# Patient Record
Sex: FEMALE | Race: WHITE | NOT HISPANIC OR LATINO | Employment: FULL TIME | URBAN - METROPOLITAN AREA
[De-identification: names, ages, dates, MRNs, and addresses within clinical notes are randomized per-mention and may not be internally consistent; named-entity substitution may affect disease eponyms.]

---

## 2017-06-16 RX ORDER — IBUPROFEN 800 MG/1
800 TABLET ORAL EVERY 8 HOURS SCHEDULED
Qty: 40 TABLET | Refills: 1 | Status: SHIPPED | OUTPATIENT
Start: 2017-06-16 | End: 2017-10-27 | Stop reason: SDUPTHER

## 2017-10-10 DIAGNOSIS — R92.8 ABNORMALITY OF LEFT BREAST ON SCREENING MAMMOGRAM: Primary | ICD-10-CM

## 2017-10-12 ENCOUNTER — TELEPHONE (OUTPATIENT)
Dept: OBSTETRICS AND GYNECOLOGY | Facility: CLINIC | Age: 52
End: 2017-10-12

## 2017-10-12 NOTE — TELEPHONE ENCOUNTER
----- Message from Usman Mendosa MD sent at 10/10/2017  9:53 PM EDT -----      ----- Message -----     From: Vicki L Severs, RegSched Rep     Sent: 10/10/2017  10:25 AM       To: Usman Mendosa MD    [facility]

## 2017-10-18 ENCOUNTER — TELEPHONE (OUTPATIENT)
Dept: OBSTETRICS AND GYNECOLOGY | Facility: CLINIC | Age: 52
End: 2017-10-18

## 2017-10-18 NOTE — TELEPHONE ENCOUNTER
----- Message from Usman Mendosa MD sent at 10/17/2017  4:21 PM EDT -----      ----- Message -----     From: Vicki L Severs, RegSched Rep     Sent: 10/17/2017   9:19 AM       To: Usman Mendosa MD    L DIAG MAMMO DONE 10/16/17 [facility]

## 2017-10-24 ENCOUNTER — OFFICE VISIT (OUTPATIENT)
Dept: OBSTETRICS AND GYNECOLOGY | Facility: CLINIC | Age: 52
End: 2017-10-24

## 2017-10-24 VITALS
WEIGHT: 186 LBS | DIASTOLIC BLOOD PRESSURE: 70 MMHG | BODY MASS INDEX: 35.12 KG/M2 | SYSTOLIC BLOOD PRESSURE: 118 MMHG | HEIGHT: 61 IN

## 2017-10-24 DIAGNOSIS — Z01.419 WELL FEMALE EXAM WITH ROUTINE GYNECOLOGICAL EXAM: Primary | ICD-10-CM

## 2017-10-24 DIAGNOSIS — E66.9 OBESITY (BMI 35.0-39.9 WITHOUT COMORBIDITY): ICD-10-CM

## 2017-10-24 DIAGNOSIS — Z12.4 PAP SMEAR FOR CERVICAL CANCER SCREENING: ICD-10-CM

## 2017-10-24 DIAGNOSIS — Z80.41 FAMILY HISTORY OF OVARIAN CANCER: ICD-10-CM

## 2017-10-24 DIAGNOSIS — Z13.89 SCREENING FOR BLOOD OR PROTEIN IN URINE: ICD-10-CM

## 2017-10-24 DIAGNOSIS — Z12.11 SCREEN FOR COLON CANCER: ICD-10-CM

## 2017-10-24 LAB
BILIRUB BLD-MCNC: NEGATIVE MG/DL
CLARITY, POC: CLEAR
COLOR UR: YELLOW
GLUCOSE UR STRIP-MCNC: NEGATIVE MG/DL
KETONES UR QL: NEGATIVE
LEUKOCYTE EST, POC: NEGATIVE
NITRITE UR-MCNC: NEGATIVE MG/ML
PH UR: 5.5 [PH] (ref 5–8)
PROT UR STRIP-MCNC: NEGATIVE MG/DL
RBC # UR STRIP: ABNORMAL /UL
SP GR UR: 1.02 (ref 1–1.03)
UROBILINOGEN UR QL: NORMAL

## 2017-10-24 PROCEDURE — 99396 PREV VISIT EST AGE 40-64: CPT | Performed by: OBSTETRICS & GYNECOLOGY

## 2017-10-24 PROCEDURE — 81002 URINALYSIS NONAUTO W/O SCOPE: CPT | Performed by: OBSTETRICS & GYNECOLOGY

## 2017-10-24 RX ORDER — CLOBETASOL PROPIONATE 0.5 MG/G
CREAM TOPICAL
Refills: 0 | COMMUNITY
Start: 2017-07-26

## 2017-10-24 NOTE — PROGRESS NOTES
[facility]  Routine Annual Visit    10/24/2017    Patient: Claudia Rahman          MR#:[id number]      History of Present Illness    52 y.o. female  who presents for annual exam.    The patient reports feeling well without complaints.  Previously referred for colonoscopy but never had screening done.  A repeat referral was submitted today.  Review of the patient's history shows that her mom was diagnosed with ovarian cancer in her 50s.  The patient reports her mom did not eventually  of ovarian cancer that was treated with radiation.  We discussed that ovarian cancer is rarely treated with radiation and question the certainty of the diagnosis.  The patient is certain of mother was diagnosed with ovarian.  We discussed screening options for the patient and genetic testing options for the patient.  We'll proceed with myriad screening for the patient as well as ISABELA screening with ultrasound    No LMP recorded (lmp unknown). Patient is postmenopausal.  Obstetric History:  OB History      Para Term  AB Living    2 1 1   2    SAB TAB Ectopic Multiple Live Births        2         Menstrual History:     No LMP recorded (lmp unknown). Patient is postmenopausal.       Sexual History:       ________________________________________  Patient Active Problem List   Diagnosis   • Well female exam with routine gynecological exam   • PVC (premature ventricular contraction)   • Abnormality of left breast on screening mammogram   • Family history of ovarian cancer   • Obesity (BMI 35.0-39.9 without comorbidity)       Past Medical History:   Diagnosis Date   • Helicobacter pylori (H. pylori) infection    • History of EKG 2016       Past Surgical History:   Procedure Laterality Date   • APPENDECTOMY     • BREAST LUMPECTOMY     • CHOLECYSTECTOMY     • TUBAL ABDOMINAL LIGATION         History   Smoking Status   • Never Smoker   Smokeless Tobacco   • Never Used       Family  "History   Problem Relation Age of Onset   • Osteoporosis Mother    • Hypertension Mother    • Heart disease Mother    • Diabetes Mother    • Ovarian cancer Mother 50   • Lung cancer Sister 59   • Breast cancer Neg Hx    • Uterine cancer Neg Hx    • Colon cancer Neg Hx    • Melanoma Neg Hx    • Prostate cancer Neg Hx        Prior to Admission medications    Medication Sig Start Date End Date Taking? Authorizing Provider   clobetasol (TEMOVATE) 0.05 % cream apply sparingly to affected area twice a day 7/26/17  Yes Historical Provider, MD   ibuprofen (ADVIL,MOTRIN) 800 MG tablet Take 1 tablet by mouth Every 8 (Eight) Hours. 6/16/17  Yes Usman Mendosa MD   RA LORATA-D  MG per 24 hr tablet  4/21/16  Yes Historical Provider, MD   estradiol-norethindrone (ACTIVELLA) 1-0.5 MG per tablet take 1 tablet by mouth once daily 8/29/16 10/24/17  Usman Mendosa MD   PARoxetine (PAXIL) 20 MG tablet take 1 tablet by mouth once daily 5/21/16 10/24/17  Historical Provider, MD     ________________________________________    Current contraception: tubal ligation  History of abnormal Pap smear: no  Family history of uterine or ovarian cancer: yes - Mother at 50  Family History of colon cancer/colon polyps: no  History of abnormal mammogram: yes - diagnostic vinayak normal  History of abnormal lipids: no    The following portions of the patient's history were reviewed and updated as appropriate: allergies, current medications, past family history, past medical history, past social history, past surgical history and problem list.    Review of Systems    Pertinent items are noted in HPI.     Objective   Physical Exam    /70  Ht 61\" (154.9 cm)  Wt 186 lb (84.4 kg)  LMP  (LMP Unknown)  Breastfeeding? No  BMI 35.14 kg/m2   BP Readings from Last 3 Encounters:   10/24/17 118/70   07/25/16 130/86      Wt Readings from Last 3 Encounters:   10/24/17 186 lb (84.4 kg)   07/25/16 185 lb (83.9 kg)        BMI: Estimated body mass index is " "35.14 kg/(m^2) as calculated from the following:    Height as of this encounter: 61\" (154.9 cm).    Weight as of this encounter: 186 lb (84.4 kg).    General:   alert, appears stated age and cooperative   Heart: regular rate and rhythm, S1, S2 normal, no murmur, click, rub or gallop   Lungs: clear to auscultation bilaterally   Abdomen: soft, non-tender, without masses or organomegaly   Breast: inspection negative, no nipple discharge or bleeding, no masses or nodularity palpable   Vulva: normal   Vagina: normal mucosa   Cervix: no lesions   Uterus: normal size   Adnexa: exam limited by habitus     As part of wellness and prevention, the following topics were discussed with the patient:    []  Nutrition  []  Physical activity/regular exercise   [x]  Healthy weight  []  Injury prevention  []  Substance misuse/abuse  []  Sexual behavior  []  STD prevention  []  Contaception  []  Dental health  []  Mental health  []  Immunization  [x]  Encouraged SBE       Assessment:    Claudia was seen today for gynecologic exam.    Diagnoses and all orders for this visit:    Well female exam with routine gynecological exam    Screening for blood or protein in urine  -     POC Urinalysis Dipstick    Pap smear for cervical cancer screening  -     IgP, Aptima HPV - ThinPrep Vial, Cervix    Screen for colon cancer  -     Ambulatory Referral For Screening Colonoscopy    Family history of ovarian cancer  -     Cancel:   -     Ovarian Malignancy Risk-ISABELA  -     US Pelvis Complete; Future    Obesity (BMI 35.0-39.9 without comorbidity)      Plan:  Return in about 1 year (around 10/24/2018) for Annual GYN exam.      Usman Mendosa MD  10/24/2017 9:43 AM  "

## 2017-10-25 LAB
CANCER AG125 SERPL-ACNC: 20.1 U/ML (ref 0–38.1)
CONV COMMENT: NORMAL
HE4 SERPL-SCNC: 45.8 PMOL/L (ref 0–105.2)
POSTMENOPAUSAL INTERP: LOW: NORMAL
PREMENOPAUSAL INTERP: LOW: NORMAL
ROMA SCORE POSTMEN SERPL: 1.28
ROMA SCORE PREMEN SERPL: 0.62

## 2017-10-27 ENCOUNTER — TELEPHONE (OUTPATIENT)
Dept: OBSTETRICS AND GYNECOLOGY | Facility: CLINIC | Age: 52
End: 2017-10-27

## 2017-10-27 LAB
CYTOLOGIST CVX/VAG CYTO: NORMAL
CYTOLOGY CVX/VAG DOC THIN PREP: NORMAL
DX ICD CODE: NORMAL
HIV 1 & 2 AB SER-IMP: NORMAL
HPV I/H RISK 4 DNA CVX QL PROBE+SIG AMP: NEGATIVE
OTHER STN SPEC: NORMAL
PATH REPORT.FINAL DX SPEC: NORMAL
STAT OF ADQ CVX/VAG CYTO-IMP: NORMAL

## 2017-10-27 RX ORDER — IBUPROFEN 800 MG/1
TABLET ORAL
Qty: 40 TABLET | Refills: 1 | Status: SHIPPED | OUTPATIENT
Start: 2017-10-27 | End: 2019-02-13 | Stop reason: SDUPTHER

## 2017-10-27 NOTE — TELEPHONE ENCOUNTER
----- Message from Usman Mendosa MD sent at 10/25/2017  4:00 PM EDT -----  Call patient roll a test risk of ovarian malignancy test is normal (low probability)

## 2017-10-30 ENCOUNTER — TELEPHONE (OUTPATIENT)
Dept: OBSTETRICS AND GYNECOLOGY | Facility: CLINIC | Age: 52
End: 2017-10-30

## 2017-10-30 NOTE — TELEPHONE ENCOUNTER
----- Message from Usman Mendosa MD sent at 10/28/2017  8:51 AM EDT -----  Call pt:  PAP is negative.

## 2017-10-31 ENCOUNTER — PROCEDURE VISIT (OUTPATIENT)
Dept: OBSTETRICS AND GYNECOLOGY | Facility: CLINIC | Age: 52
End: 2017-10-31

## 2017-10-31 DIAGNOSIS — E66.9 OBESITY (BMI 35.0-39.9 WITHOUT COMORBIDITY): ICD-10-CM

## 2017-10-31 DIAGNOSIS — Z80.41 FAMILY HISTORY OF OVARIAN CANCER: ICD-10-CM

## 2017-10-31 PROCEDURE — 76830 TRANSVAGINAL US NON-OB: CPT | Performed by: OBSTETRICS & GYNECOLOGY

## 2017-11-01 ENCOUNTER — TELEPHONE (OUTPATIENT)
Dept: OBSTETRICS AND GYNECOLOGY | Facility: CLINIC | Age: 52
End: 2017-11-01

## 2017-11-01 NOTE — TELEPHONE ENCOUNTER
----- Message from Usman Mendosa MD sent at 11/1/2017  8:17 AM EDT -----  Call patient and advised pelvic ultrasound essentially within normal limits.  Ovaries were not visualized which is not uncommon in a postmenopausal female

## 2017-11-09 ENCOUNTER — TELEPHONE (OUTPATIENT)
Dept: OBSTETRICS AND GYNECOLOGY | Facility: CLINIC | Age: 52
End: 2017-11-09

## 2017-11-09 NOTE — TELEPHONE ENCOUNTER
----- Message from Usman Mendosa MD sent at 11/8/2017  2:39 AM EST -----      Progress Notes      Call pt:  Myriad comprehensive genetic screening is NEGATIVE.     NO significant genotypes are identified that predispose the patient to cancer.     A separate report will be sent to the patient for her records.     The report provides her lifetime risk of breast cancer .... Which slightly below the baseline for the population.

## 2019-01-09 ENCOUNTER — OFFICE VISIT (OUTPATIENT)
Dept: SURGERY | Facility: CLINIC | Age: 54
End: 2019-01-09

## 2019-01-09 VITALS
DIASTOLIC BLOOD PRESSURE: 80 MMHG | OXYGEN SATURATION: 99 % | SYSTOLIC BLOOD PRESSURE: 140 MMHG | HEIGHT: 60 IN | HEART RATE: 84 BPM | BODY MASS INDEX: 37.18 KG/M2 | WEIGHT: 189.4 LBS

## 2019-01-09 DIAGNOSIS — Z12.11 ENCOUNTER FOR SCREENING COLONOSCOPY: Primary | ICD-10-CM

## 2019-01-09 PROCEDURE — 99203 OFFICE O/P NEW LOW 30 MIN: CPT | Performed by: SURGERY

## 2019-01-09 RX ORDER — LANOLIN ALCOHOL/MO/W.PET/CERES
400 CREAM (GRAM) TOPICAL DAILY
COMMUNITY
End: 2020-03-18

## 2019-01-09 RX ORDER — SACCHAROMYCES BOULARDII 250 MG
250 CAPSULE ORAL 2 TIMES DAILY PRN
COMMUNITY
End: 2021-03-24

## 2019-01-09 NOTE — PROGRESS NOTES
Chief Complaint   Patient presents with   • Diarrhea     needs c-scope        Patient is a 53 y.o. female referred by Usman Mendosa MD for a screening colonoscopy.  Patient reports she has never had a flexible sigmoidoscopy, barium enema or colonoscopy.  Patient denies any melena, hematochezia, bright red blood per rectum, unexplained weight loss, nausea, vomiting, fever or chills.  Patient denies any family history of ulcerative colitis, Crohn's disease, familial polyposis or colon cancer.    Past Medical History:   Diagnosis Date   • Diarrhea    • Helicobacter pylori (H. pylori) infection    • History of EKG 07/06/2016   • PVC (premature ventricular contraction)      Past Surgical History:   Procedure Laterality Date   • APPENDECTOMY  1985   • BREAST BIOPSY     • BREAST LUMPECTOMY  1995   • CHOLECYSTECTOMY  1990   • TUBAL ABDOMINAL LIGATION  1995     Family History   Problem Relation Age of Onset   • Osteoporosis Mother    • Hypertension Mother    • Heart disease Mother    • Diabetes Mother    • Ovarian cancer Mother 50   • Lung cancer Sister 59   • Heart disease Sister    • Breast cancer Neg Hx    • Uterine cancer Neg Hx    • Colon cancer Neg Hx    • Melanoma Neg Hx    • Prostate cancer Neg Hx      Social History     Tobacco Use   • Smoking status: Never Smoker   • Smokeless tobacco: Never Used   Substance Use Topics   • Alcohol use: Yes     Comment: social   • Drug use: No     No Known Allergies    Current Outpatient Medications:   •  calcium citrate-vitamin d (CITRACAL) 200-250 MG-UNIT tablet tablet, Take  by mouth Daily., Disp: , Rfl:   •  clobetasol (TEMOVATE) 0.05 % cream, apply sparingly to affected area twice a day, Disp: , Rfl: 0  •  folic acid (FOLVITE) 400 MCG tablet, Take 400 mcg by mouth Daily., Disp: , Rfl:   •  ibuprofen (ADVIL,MOTRIN) 800 MG tablet, take 1 tablet by mouth every 8 hours, Disp: 40 tablet, Rfl: 1  •  RA LORATA-D  MG per 24 hr tablet, , Disp: , Rfl: 1  •  saccharomyces boulardii  "(FLORASTOR) 250 MG capsule, Take 250 mg by mouth 2 (Two) Times a Day., Disp: , Rfl:     Review of Systems   Gastrointestinal: Positive for diarrhea.   Musculoskeletal: Positive for arthralgias and joint swelling.   Neurological: Positive for headaches.   All other systems reviewed and are negative.      Vitals:    01/09/19 1018   BP: 140/80   Pulse: 84   SpO2: 99%   Weight: 85.9 kg (189 lb 6.4 oz)   Height: 152 cm (59.84\")       Physical Exam  General/physcological:   Alert and oriented x3, in no acute distress  HEENT: Normal cephalic, atraumatic, PERRLA, EOMI, sclera anicteric, moist mucous membranes, neck is supple, no JVD, no carotid bruits, no thyromegaly no adenopathy  Respiratory: CTA and percussion  CVA: RRR, normal S1-S2, no murmurs, no gallops or rubs  GI: Positive BS, soft, nondistended, nontender, no rebound, no guarding, no hernias, no organomegaly and no masses  Musculoskeletal: Full range of motion, no clubbing, no cyanosis or edema  Neurovascular: Grossly intact  Debilities: none  Emotional behavior: appropriate     Patient does not use tobacco products currently.     Assessment:  Screening colonoscopy  Plan:  I have recommended that the patient undergo screening colonoscopy in accordance with American Cancer Society's guidelines.  I have discussed this procedure in detail with the patient.  I have discussed the risks, benefits and alternatives.  I have discussed the risk of anesthesia, bleeding and perforation.  Patient stands these risk, benefits and alternatives and wishes to proceed.  I have her scheduled at her earliest convenience.    Marlena Urban MD  General, Minimally Invasive and Endoscopic Surgery  [facility]      [address] [facility] [address] [place] [address]    [address]  [place], [place] [address] [place] [address]    P: [phone]  F: [phone]    Cc:  An Pinto MD                  "

## 2019-02-06 ENCOUNTER — ANESTHESIA EVENT (OUTPATIENT)
Dept: PERIOP | Facility: HOSPITAL | Age: 54
End: 2019-02-06

## 2019-02-07 ENCOUNTER — HOSPITAL ENCOUNTER (OUTPATIENT)
Facility: HOSPITAL | Age: 54
Setting detail: HOSPITAL OUTPATIENT SURGERY
Discharge: HOME | End: 2019-02-07
Attending: SURGERY | Admitting: SURGERY

## 2019-02-07 ENCOUNTER — ANESTHESIA (OUTPATIENT)
Dept: PERIOP | Facility: HOSPITAL | Age: 54
End: 2019-02-07

## 2019-02-07 VITALS
HEART RATE: 93 BPM | WEIGHT: 184.6 LBS | OXYGEN SATURATION: 98 % | SYSTOLIC BLOOD PRESSURE: 110 MMHG | DIASTOLIC BLOOD PRESSURE: 67 MMHG | HEIGHT: 60 IN | RESPIRATION RATE: 16 BRPM | TEMPERATURE: 97.6 F | BODY MASS INDEX: 36.24 KG/M2

## 2019-02-07 PROCEDURE — 45378 DIAGNOSTIC COLONOSCOPY: CPT | Performed by: SURGERY

## 2019-02-07 PROCEDURE — S0260 H&P FOR SURGERY: HCPCS | Performed by: SURGERY

## 2019-02-07 PROCEDURE — 25010000002 PROPOFOL 10 MG/ML EMULSION: Performed by: NURSE ANESTHETIST, CERTIFIED REGISTERED

## 2019-02-07 RX ORDER — ACETAMINOPHEN 500 MG
500 TABLET ORAL EVERY 6 HOURS PRN
COMMUNITY
End: 2019-03-11

## 2019-02-07 RX ORDER — SODIUM CHLORIDE 9 MG/ML
40 INJECTION, SOLUTION INTRAVENOUS AS NEEDED
Status: DISCONTINUED | OUTPATIENT
Start: 2019-02-07 | End: 2019-02-07 | Stop reason: HOSPADM

## 2019-02-07 RX ORDER — SODIUM CHLORIDE 0.9 % (FLUSH) 0.9 %
3 SYRINGE (ML) INJECTION EVERY 12 HOURS SCHEDULED
Status: DISCONTINUED | OUTPATIENT
Start: 2019-02-07 | End: 2019-02-07 | Stop reason: HOSPADM

## 2019-02-07 RX ORDER — SODIUM CHLORIDE 0.9 % (FLUSH) 0.9 %
1-10 SYRINGE (ML) INJECTION AS NEEDED
Status: DISCONTINUED | OUTPATIENT
Start: 2019-02-07 | End: 2019-02-07 | Stop reason: HOSPADM

## 2019-02-07 RX ORDER — PROPOFOL 10 MG/ML
VIAL (ML) INTRAVENOUS CONTINUOUS PRN
Status: DISCONTINUED | OUTPATIENT
Start: 2019-02-07 | End: 2019-02-07 | Stop reason: SURG

## 2019-02-07 RX ORDER — LIDOCAINE HYDROCHLORIDE 10 MG/ML
INJECTION, SOLUTION INFILTRATION; PERINEURAL AS NEEDED
Status: DISCONTINUED | OUTPATIENT
Start: 2019-02-07 | End: 2019-02-07 | Stop reason: SURG

## 2019-02-07 RX ORDER — LIDOCAINE HYDROCHLORIDE 10 MG/ML
0.5 INJECTION, SOLUTION EPIDURAL; INFILTRATION; INTRACAUDAL; PERINEURAL ONCE AS NEEDED
Status: COMPLETED | OUTPATIENT
Start: 2019-02-07 | End: 2019-02-07

## 2019-02-07 RX ORDER — SODIUM CHLORIDE, SODIUM LACTATE, POTASSIUM CHLORIDE, CALCIUM CHLORIDE 600; 310; 30; 20 MG/100ML; MG/100ML; MG/100ML; MG/100ML
9 INJECTION, SOLUTION INTRAVENOUS CONTINUOUS
Status: DISCONTINUED | OUTPATIENT
Start: 2019-02-07 | End: 2019-02-07 | Stop reason: HOSPADM

## 2019-02-07 RX ORDER — PROPOFOL 10 MG/ML
VIAL (ML) INTRAVENOUS AS NEEDED
Status: DISCONTINUED | OUTPATIENT
Start: 2019-02-07 | End: 2019-02-07 | Stop reason: SURG

## 2019-02-07 RX ORDER — GLYCOPYRROLATE 0.2 MG/ML
INJECTION INTRAMUSCULAR; INTRAVENOUS AS NEEDED
Status: DISCONTINUED | OUTPATIENT
Start: 2019-02-07 | End: 2019-02-07 | Stop reason: SURG

## 2019-02-07 RX ADMIN — PROPOFOL 50 MG: 10 INJECTION, EMULSION INTRAVENOUS at 08:20

## 2019-02-07 RX ADMIN — PROPOFOL 50 MG: 10 INJECTION, EMULSION INTRAVENOUS at 08:36

## 2019-02-07 RX ADMIN — LIDOCAINE HYDROCHLORIDE 50 MG: 10 INJECTION, SOLUTION INFILTRATION; PERINEURAL at 08:15

## 2019-02-07 RX ADMIN — SODIUM CHLORIDE, POTASSIUM CHLORIDE, SODIUM LACTATE AND CALCIUM CHLORIDE: 600; 310; 30; 20 INJECTION, SOLUTION INTRAVENOUS at 08:13

## 2019-02-07 RX ADMIN — PROPOFOL 50 MG: 10 INJECTION, EMULSION INTRAVENOUS at 08:31

## 2019-02-07 RX ADMIN — SODIUM CHLORIDE, POTASSIUM CHLORIDE, SODIUM LACTATE AND CALCIUM CHLORIDE 9 ML/HR: 600; 310; 30; 20 INJECTION, SOLUTION INTRAVENOUS at 07:15

## 2019-02-07 RX ADMIN — PROPOFOL 100 MCG/KG/MIN: 10 INJECTION, EMULSION INTRAVENOUS at 08:16

## 2019-02-07 RX ADMIN — PROPOFOL 50 MG: 10 INJECTION, EMULSION INTRAVENOUS at 08:26

## 2019-02-07 RX ADMIN — LIDOCAINE HYDROCHLORIDE 0.2 ML: 10 INJECTION, SOLUTION EPIDURAL; INFILTRATION; INTRACAUDAL; PERINEURAL at 07:15

## 2019-02-07 RX ADMIN — GLYCOPYRROLATE 0.1 MG: 0.2 INJECTION INTRAMUSCULAR; INTRAVENOUS at 08:15

## 2019-02-07 RX ADMIN — PROPOFOL 50 MG: 10 INJECTION, EMULSION INTRAVENOUS at 08:16

## 2019-02-07 NOTE — OP NOTE
Operative Note:    Pre-op dx: Screening Colonoscopy    Post-op dx: normal    Procedure: Colonoscopy    Surgeon: Marlena Urban MD    Anesthesia: MAC    EBL: none    Specimen:  * No orders in the log *    Complications: none    Procedure:    Colonoscopy:  A digital rectal examination was performed which revealed no rectal masses.  Scope was introduced into the rectum and advanced into the sigmoid, descending colon, splenic flexure, transverse colon, hepatic flexure, ascending colon and into the cecum.  Cecum was identified by the ileocecal valve and appendiceal orifice.  There was no evidence of any diverticulosis, polyps, masses, AV malformation or inflammation.  The bowel preparation was excellent. The scope was slowly withdrawn and a second look was performed.  Upon the second look, there were no new findings.  The colon was desufflated and the procedure was terminated.   Patient was transferred to recovery room in stable condition.      Assessment/Plan:  Repeat colonoscopy in 10 years.      Marlena Urban MD  General, Minimally Invasive and Endoscopic Surgery  [facility]    [address] [facility] [address] [place] [address]   [address]    [address]  [address] [address]    P: [phone]  F: [phone]    Cc:  An Pinto MD      
no

## 2019-02-07 NOTE — H&P
Chief Complaint   Patient presents with   • Diarrhea       needs c-scope         Patient is a 53 y.o. female referred by Usman Mendosa MD for a screening colonoscopy.  Patient reports she has never had a flexible sigmoidoscopy, barium enema or colonoscopy.  Patient denies any melena, hematochezia, bright red blood per rectum, unexplained weight loss, nausea, vomiting, fever or chills.  Patient denies any family history of ulcerative colitis, Crohn's disease, familial polyposis or colon cancer.     Medical History        Past Medical History:   Diagnosis Date   • Diarrhea     • Helicobacter pylori (H. pylori) infection     • History of EKG 07/06/2016   • PVC (premature ventricular contraction)           Surgical History         Past Surgical History:   Procedure Laterality Date   • APPENDECTOMY   1985   • BREAST BIOPSY       • BREAST LUMPECTOMY   1995   • CHOLECYSTECTOMY   1990   • TUBAL ABDOMINAL LIGATION   1995               Family History   Problem Relation Age of Onset   • Osteoporosis Mother     • Hypertension Mother     • Heart disease Mother     • Diabetes Mother     • Ovarian cancer Mother 50   • Lung cancer Sister 59   • Heart disease Sister     • Breast cancer Neg Hx     • Uterine cancer Neg Hx     • Colon cancer Neg Hx     • Melanoma Neg Hx     • Prostate cancer Neg Hx        Social History            Tobacco Use   • Smoking status: Never Smoker   • Smokeless tobacco: Never Used   Substance Use Topics   • Alcohol use: Yes       Comment: social   • Drug use: No      No Known Allergies     Current Outpatient Medications:   •  calcium citrate-vitamin d (CITRACAL) 200-250 MG-UNIT tablet tablet, Take  by mouth Daily., Disp: , Rfl:   •  clobetasol (TEMOVATE) 0.05 % cream, apply sparingly to affected area twice a day, Disp: , Rfl: 0  •  folic acid (FOLVITE) 400 MCG tablet, Take 400 mcg by mouth Daily., Disp: , Rfl:   •  ibuprofen (ADVIL,MOTRIN) 800 MG tablet, take 1 tablet by mouth every 8 hours, Disp: 40 tablet,  "Rfl: 1  •  RA LORATA-D  MG per 24 hr tablet, , Disp: , Rfl: 1  •  saccharomyces boulardii (FLORASTOR) 250 MG capsule, Take 250 mg by mouth 2 (Two) Times a Day., Disp: , Rfl:      Review of Systems   Gastrointestinal: Positive for diarrhea.   Musculoskeletal: Positive for arthralgias and joint swelling.   Neurological: Positive for headaches.   All other systems reviewed and are negative.    /91 (BP Location: Left arm, Patient Position: Lying)   Pulse 103   Temp 97.5 °F (36.4 °C) (Oral)   Resp 14   Ht 152.4 cm (60\")   Wt 83.7 kg (184 lb 9.6 oz)   LMP  (LMP Unknown)   SpO2 98%   BMI 36.05 kg/m²     Physical Exam  General/physcological:   Alert and oriented x3, in no acute distress  HEENT: Normal cephalic, atraumatic, PERRLA, EOMI, sclera anicteric, moist mucous membranes, neck is supple, no JVD, no carotid bruits, no thyromegaly no adenopathy  Respiratory: CTA and percussion  CVA: RRR, normal S1-S2, no murmurs, no gallops or rubs  GI: Positive BS, soft, nondistended, nontender, no rebound, no guarding, no hernias, no organomegaly and no masses  Musculoskeletal: Full range of motion, no clubbing, no cyanosis or edema  Neurovascular: Grossly intact  Debilities: none  Emotional behavior: appropriate      Patient does not use tobacco products currently.      Assessment:  Screening colonoscopy  Plan:  I have recommended that the patient undergo screening colonoscopy in accordance with American Cancer Society's guidelines.  I have discussed this procedure in detail with the patient.  I have discussed the risks, benefits and alternatives.  I have discussed the risk of anesthesia, bleeding and perforation.  Patient stands these risk, benefits and alternatives and wishes to proceed.  I have her scheduled at her earliest convenience.     Marlena Urban MD  General, Minimally Invasive and Endoscopic Surgery  [facility]        [facility] [address]   [address]  "                                 [address]  [place] [address]               [place] [address]     P: [phone]  F: [phone]     Cc:  An Pinto MD

## 2019-02-07 NOTE — ANESTHESIA PREPROCEDURE EVALUATION
Anesthesia Evaluation     Patient summary reviewed and Nursing notes reviewed   no history of anesthetic complications:  NPO Solid Status: > 8 hours  NPO Liquid Status: > 2 hours           Airway   Mallampati: II  TM distance: >3 FB  Neck ROM: full  No difficulty expected  Dental - normal exam     Pulmonary - negative pulmonary ROS and normal exam    breath sounds clear to auscultation  Sleep apnea: snores.  Cardiovascular - negative cardio ROS and normal exam    ECG reviewed  Rhythm: regular  Rate: normal        Neuro/Psych- negative ROS  GI/Hepatic/Renal/Endo    (+) obesity,       Musculoskeletal     Abdominal   (+) obese,    Substance History   (+) alcohol use (occ),      OB/GYN          Other   (+) arthritis                     Anesthesia Plan    ASA 2     MAC     intravenous induction   Anesthetic plan, all risks, benefits, and alternatives have been provided, discussed and informed consent has been obtained with: patient.  Use of blood products discussed with patient  Consented to blood products.

## 2019-02-07 NOTE — ANESTHESIA POSTPROCEDURE EVALUATION
Patient: Claudia Rahman    Procedure Summary     Date:  02/07/19 Room / Location:  [facility] ENDOSCOPY 2 /  [facility]    Anesthesia Start:  0814 Anesthesia Stop:  0839    Procedure:  COLONOSCOPY (N/A ) Diagnosis:       Encounter for screening colonoscopy      (Encounter for screening colonoscopy [Z12.11])    Surgeon:  Marlena Urban MD Provider:  Juan Atkinson CRNA    Anesthesia Type:  MAC ASA Status:  2          Anesthesia Type: MAC  Last vitals  BP   114/83 (02/07/19 0920)   Temp   97.6 °F (36.4 °C) (02/07/19 0842)   Pulse   72 (02/07/19 0920)   Resp   16 (02/07/19 0920)     SpO2   98 % (02/07/19 0920)     Post Anesthesia Care and Evaluation    Patient location during evaluation: bedside  Patient participation: complete - patient participated  Level of consciousness: awake  Pain score: 0  Pain management: adequate  Airway patency: patent  Anesthetic complications: No anesthetic complications  PONV Status: none  Cardiovascular status: acceptable  Respiratory status: acceptable  Hydration status: acceptable

## 2019-02-13 RX ORDER — IBUPROFEN 800 MG/1
TABLET ORAL
Qty: 40 TABLET | Refills: 0 | Status: SHIPPED | OUTPATIENT
Start: 2019-02-13 | End: 2019-03-14 | Stop reason: SDUPTHER

## 2019-03-11 ENCOUNTER — OFFICE VISIT (OUTPATIENT)
Dept: OBSTETRICS AND GYNECOLOGY | Age: 54
End: 2019-03-11

## 2019-03-11 ENCOUNTER — TELEPHONE (OUTPATIENT)
Dept: OBSTETRICS AND GYNECOLOGY | Age: 54
End: 2019-03-11

## 2019-03-11 VITALS
SYSTOLIC BLOOD PRESSURE: 118 MMHG | BODY MASS INDEX: 36.91 KG/M2 | HEIGHT: 60 IN | DIASTOLIC BLOOD PRESSURE: 80 MMHG | WEIGHT: 188 LBS

## 2019-03-11 DIAGNOSIS — Z12.31 SCREENING MAMMOGRAM, ENCOUNTER FOR: ICD-10-CM

## 2019-03-11 DIAGNOSIS — Z01.419 WELL FEMALE EXAM WITH ROUTINE GYNECOLOGICAL EXAM: Primary | ICD-10-CM

## 2019-03-11 DIAGNOSIS — Z12.4 PAP SMEAR FOR CERVICAL CANCER SCREENING: ICD-10-CM

## 2019-03-11 DIAGNOSIS — Z13.89 SCREENING FOR BLOOD OR PROTEIN IN URINE: ICD-10-CM

## 2019-03-11 PROBLEM — Z12.11 ENCOUNTER FOR SCREENING COLONOSCOPY: Status: RESOLVED | Noted: 2019-01-09 | Resolved: 2019-03-11

## 2019-03-11 PROCEDURE — 81002 URINALYSIS NONAUTO W/O SCOPE: CPT | Performed by: OBSTETRICS & GYNECOLOGY

## 2019-03-11 PROCEDURE — 99396 PREV VISIT EST AGE 40-64: CPT | Performed by: OBSTETRICS & GYNECOLOGY

## 2019-03-11 NOTE — PROGRESS NOTES
Routine Annual Visit    3/11/2019    Patient: Claudia Rahman          MR#:[id number]      History of Present Illness    Chief Complaint   Patient presents with   • Gynecologic Exam     Annual.  Last pap 10/24/17, negative. Last mammo 10/16/17, negative. Colonoscopy 19.-nml.        53 y.o. female  who presents for annual exam.    Patient presents for routine GYN check reporting weight gain.    Mammogram was recently completed - report requested.       No LMP recorded (lmp unknown). Patient is postmenopausal.  Obstetric History:  OB History      Para Term  AB Living    2 2 2     2    SAB TAB Ectopic Molar Multiple Live Births              2         Menstrual History:     No LMP recorded (lmp unknown). Patient is postmenopausal.       Sexual History:       ________________________________________  Patient Active Problem List   Diagnosis   • Well female exam with routine gynecological exam   • Abnormality of left breast on screening mammogram   • Family history of ovarian cancer   • Obesity (BMI 35.0-39.9 without comorbidity)       Past Medical History:   Diagnosis Date   • Diarrhea    • Helicobacter pylori (H. pylori) infection    • History of EKG 2016   • PVC (premature ventricular contraction)        Past Surgical History:   Procedure Laterality Date   • APPENDECTOMY     • BREAST BIOPSY     • BREAST LUMPECTOMY     • CHOLECYSTECTOMY     • COLONOSCOPY N/A 2019    Procedure: COLONOSCOPY;  Surgeon: Marlena Urban MD;  Location: [facility];  Service: Gastroenterology   • TUBAL ABDOMINAL LIGATION         Social History     Tobacco Use   Smoking Status Never Smoker   Smokeless Tobacco Never Used       Family History   Problem Relation Age of Onset   • Osteoporosis Mother    • Hypertension Mother    • Heart disease Mother    • Diabetes Mother    • Ovarian cancer Mother 50   • Lung cancer Sister 59   • Heart disease Sister    • Breast cancer Neg Hx    • Uterine cancer  "Neg Hx    • Colon cancer Neg Hx    • Melanoma Neg Hx    • Prostate cancer Neg Hx        Prior to Admission medications    Medication Sig Start Date End Date Taking? Authorizing Provider   calcium citrate-vitamin d (CITRACAL) 200-250 MG-UNIT tablet tablet Take  by mouth Daily.   Yes Michell Milton MD   clobetasol (TEMOVATE) 0.05 % cream apply sparingly to affected area twice a day 7/26/17  Yes Michell Milton MD   folic acid (FOLVITE) 400 MCG tablet Take 400 mcg by mouth Daily.   Yes Michell Milton MD   ibuprofen (ADVIL,MOTRIN) 800 MG tablet TAKE 1 TABLET BY MOUTH THREE TIMES DAILY( EVERY EIGHT HOURS) 2/13/19  Yes Usman Mendosa MD   RA LORATA-D  MG per 24 hr tablet Take 1 tablet by mouth Daily As Needed. 4/21/16  Yes Michell Milton MD   saccharomyces boulardii (FLORASTOR) 250 MG capsule Take 250 mg by mouth 2 (Two) Times a Day As Needed.   Yes Michell Milton MD   acetaminophen (TYLENOL) 500 MG tablet Take 500 mg by mouth Every 6 (Six) Hours As Needed for Mild Pain .    Michell Milton MD     ________________________________________    Current contraception: tubal ligation  History of abnormal Pap smear: no  Family history of uterine or ovarian cancer: no  Family History of colon cancer/colon polyps: no  History of abnormal mammogram: yes - w/u negative   History of abnormal lipids: no    The following portions of the patient's history were reviewed and updated as appropriate: allergies, current medications, past family history, past medical history, past social history, past surgical history and problem list.    Review of Systems    Pertinent items are noted in HPI.     Objective   Physical Exam    /80   Ht 152.4 cm (60\")   Wt 85.3 kg (188 lb)   LMP  (LMP Unknown)   Breastfeeding? No   BMI 36.72 kg/m²    BP Readings from Last 3 Encounters:   03/11/19 118/80   02/07/19 110/67   01/09/19 140/80      Wt Readings from Last 3 Encounters:   03/11/19 85.3 kg (188 " "lb)   02/07/19 83.7 kg (184 lb 9.6 oz)   01/09/19 85.9 kg (189 lb 6.4 oz)        BMI: Estimated body mass index is 36.72 kg/m² as calculated from the following:    Height as of this encounter: 152.4 cm (60\").    Weight as of this encounter: 85.3 kg (188 lb).       General: alert, appears stated age and cooperative   Heart: regular rate and rhythm, S1, S2 normal, no murmur, click, rub or gallop   Lungs: clear to auscultation bilaterally   Abdomen: soft, non-tender, without masses, no organomegaly   Breast: inspection negative, no nipple discharge or bleeding, no masses or nodularity palpable   Vulva: normal and bartholin's, Urethra, [place]'s normal   Vagina: normal mucosa, normal discharge   Cervix: no lesions   Uterus: normal size or exam is limited habitus    Adnexa: exam limited by habitus     As part of wellness and prevention, the following topics were discussed with the patient:  []  Nutrition  [x]  Physical activity/regular exercise   [x]  Healthy weight  []  Injury prevention  []  Smoking cessation  []  Substance misuse/abuse  []  Sexual behavior  []  STD prevention  []  Contaception  []  Dental health  []  Mental health  []  Immunization  [x]  Encouraged SBE        Assessment:    Claudia was seen today for gynecologic exam.    Diagnoses and all orders for this visit:    Well female exam with routine gynecological exam  -     IgP, Aptima HPV    Screening for blood or protein in urine  -     POC Urinalysis Dipstick    Pap smear for cervical cancer screening  -     IgP, Aptima HPV    Screening mammogram, encounter for  -     Mammo Screening Bilateral With CAD; Future          Plan:  Return in about 1 year (around 3/11/2020) for Annual GYN exam.      Usman Mendosa MD  3/11/2019 10:58 AM  "

## 2019-03-11 NOTE — TELEPHONE ENCOUNTER
----- Message from Usman Mendosa MD sent at 3/11/2019  1:12 PM EDT -----  Call patient: Normal mammogram

## 2019-03-13 ENCOUNTER — TELEPHONE (OUTPATIENT)
Dept: OBSTETRICS AND GYNECOLOGY | Age: 54
End: 2019-03-13

## 2019-03-13 NOTE — TELEPHONE ENCOUNTER
----- Message from Usman Mendosa MD sent at 3/13/2019  3:11 PM EDT -----  Call pt:  PAP is negative.

## 2019-03-14 RX ORDER — IBUPROFEN 800 MG/1
TABLET ORAL
Qty: 40 TABLET | Refills: 3 | Status: SHIPPED | OUTPATIENT
Start: 2019-03-14 | End: 2019-07-17 | Stop reason: SDUPTHER

## 2019-07-18 RX ORDER — IBUPROFEN 800 MG/1
TABLET ORAL
Qty: 40 TABLET | Refills: 1 | Status: SHIPPED | OUTPATIENT
Start: 2019-07-18 | End: 2019-10-21 | Stop reason: SDUPTHER

## 2019-10-21 RX ORDER — IBUPROFEN 800 MG/1
TABLET ORAL
Qty: 40 TABLET | Refills: 0 | Status: SHIPPED | OUTPATIENT
Start: 2019-10-21 | End: 2020-01-03

## 2020-01-03 RX ORDER — IBUPROFEN 800 MG/1
TABLET ORAL
Qty: 40 TABLET | Refills: 0 | Status: SHIPPED | OUTPATIENT
Start: 2020-01-03

## 2020-01-15 ENCOUNTER — TELEPHONE (OUTPATIENT)
Dept: OBSTETRICS AND GYNECOLOGY | Age: 55
End: 2020-01-15

## 2020-01-15 NOTE — TELEPHONE ENCOUNTER
----- Message from Usman Mendosa MD sent at 1/15/2020  1:09 PM EST -----  Notify patient:  Mammogram is benign

## 2020-03-18 ENCOUNTER — OFFICE VISIT (OUTPATIENT)
Dept: OBSTETRICS AND GYNECOLOGY | Age: 55
End: 2020-03-18

## 2020-03-18 VITALS
SYSTOLIC BLOOD PRESSURE: 136 MMHG | HEIGHT: 60 IN | BODY MASS INDEX: 36.52 KG/M2 | WEIGHT: 186 LBS | DIASTOLIC BLOOD PRESSURE: 82 MMHG

## 2020-03-18 DIAGNOSIS — Z12.4 PAP SMEAR FOR CERVICAL CANCER SCREENING: ICD-10-CM

## 2020-03-18 DIAGNOSIS — E66.9 OBESITY (BMI 35.0-39.9 WITHOUT COMORBIDITY): ICD-10-CM

## 2020-03-18 DIAGNOSIS — Z01.419 WELL FEMALE EXAM WITH ROUTINE GYNECOLOGICAL EXAM: Primary | ICD-10-CM

## 2020-03-18 DIAGNOSIS — Z80.41 FAMILY HISTORY OF OVARIAN CANCER: ICD-10-CM

## 2020-03-18 DIAGNOSIS — R92.8 ABNORMALITY OF LEFT BREAST ON SCREENING MAMMOGRAM: ICD-10-CM

## 2020-03-18 DIAGNOSIS — Z13.89 SCREENING FOR BLOOD OR PROTEIN IN URINE: ICD-10-CM

## 2020-03-18 LAB
BILIRUB BLD-MCNC: NEGATIVE MG/DL
CLARITY, POC: CLEAR
COLOR UR: YELLOW
GLUCOSE UR STRIP-MCNC: NEGATIVE MG/DL
KETONES UR QL: NEGATIVE
LEUKOCYTE EST, POC: ABNORMAL
NITRITE UR-MCNC: NEGATIVE MG/ML
PH UR: 7 [PH] (ref 5–8)
PROT UR STRIP-MCNC: ABNORMAL MG/DL
RBC # UR STRIP: NEGATIVE /UL
SP GR UR: 1.02 (ref 1–1.03)
UROBILINOGEN UR QL: NORMAL

## 2020-03-18 PROCEDURE — 99396 PREV VISIT EST AGE 40-64: CPT | Performed by: OBSTETRICS & GYNECOLOGY

## 2020-03-18 PROCEDURE — 81002 URINALYSIS NONAUTO W/O SCOPE: CPT | Performed by: OBSTETRICS & GYNECOLOGY

## 2020-03-18 RX ORDER — MELOXICAM 15 MG/1
15 TABLET ORAL DAILY PRN
COMMUNITY
End: 2021-03-24

## 2020-03-18 NOTE — PROGRESS NOTES
Routine Annual Visit    3/18/2020    Patient: Claudia Rahman          MR#:[id number]      History of Present Illness    Chief Complaint   Patient presents with   • Gynecologic Exam     Annual.  Last pap 3/11/19, negative. Last mammo 1/15/2020, benign. Colonoscopy 19.        54 y.o. female  who presents for annual exam.    Patient presents for routine annual exam feeling well without complaints.    No LMP recorded (lmp unknown). Patient is postmenopausal.  Obstetric History:  OB History        2    Para   2    Term   2            AB        Living   2       SAB        TAB        Ectopic        Molar        Multiple        Live Births   2               Menstrual History:     No LMP recorded (lmp unknown). Patient is postmenopausal.       Sexual History:       ________________________________________  Patient Active Problem List   Diagnosis   • Well female exam with routine gynecological exam   • Family history of ovarian cancer   • Obesity (BMI 35.0-39.9 without comorbidity)       Past Medical History:   Diagnosis Date   • Diarrhea    • Helicobacter pylori (H. pylori) infection    • History of EKG 2016   • PVC (premature ventricular contraction)        Past Surgical History:   Procedure Laterality Date   • APPENDECTOMY     • BREAST BIOPSY     • BREAST LUMPECTOMY     • CHOLECYSTECTOMY     • COLONOSCOPY N/A 2019    Procedure: COLONOSCOPY;  Surgeon: Marlena Urban MD;  Location: [facility];  Service: Gastroenterology   • TUBAL ABDOMINAL LIGATION         Social History     Tobacco Use   Smoking Status Never Smoker   Smokeless Tobacco Never Used       Family History   Problem Relation Age of Onset   • Osteoporosis Mother    • Hypertension Mother    • Heart disease Mother    • Diabetes Mother    • Ovarian cancer Mother 50   • Lung cancer Sister 59   • Heart disease Sister    • Breast cancer Neg Hx    • Uterine cancer Neg Hx    • Colon cancer Neg Hx    • Melanoma Neg  "Hx    • Prostate cancer Neg Hx        Prior to Admission medications    Medication Sig Start Date End Date Taking? Authorizing Provider   calcium citrate-vitamin d (CITRACAL) 200-250 MG-UNIT tablet tablet Take  by mouth Daily.   Yes Michell Milton MD   clobetasol (TEMOVATE) 0.05 % cream apply sparingly to affected area twice a day 7/26/17  Yes Michell Milton MD   ibuprofen (ADVIL,MOTRIN) 800 MG tablet TAKE 1 TABLET BY MOUTH THREE TIMES DAILY  Patient taking differently: Take 800 mg by mouth Every 6 (Six) Hours As Needed. 1/3/20  Yes Charito Tran APRN   meloxicam (MOBIC) 15 MG tablet Take 15 mg by mouth Daily As Needed.   Yes Michell Milton MD   RA LORATA-D  MG per 24 hr tablet Take 1 tablet by mouth Daily As Needed. 4/21/16  Yes Provider, Historical, MD   saccharomyces boulardii (FLORASTOR) 250 MG capsule Take 250 mg by mouth 2 (Two) Times a Day As Needed.   Yes Michell Milton MD   folic acid (FOLVITE) 400 MCG tablet Take 400 mcg by mouth Daily.    Michell Milton MD     ________________________________________    Current contraception: tubal ligation  History of abnormal Pap smear: no  Family history of uterine or ovarian cancer: no  Family History of colon cancer/colon polyps: no  History of abnormal mammogram: yes - Issue resolved with additional imaging   History of abnormal lipids: no    The following portions of the patient's history were reviewed and updated as appropriate: allergies, current medications, past family history, past medical history, past social history, past surgical history and problem list.    Review of Systems    Pertinent items are noted in HPI.     Objective   Physical Exam    /82   Ht 152.4 cm (60\")   Wt 84.4 kg (186 lb)   LMP  (LMP Unknown)   Breastfeeding No   BMI 36.33 kg/m²    BP Readings from Last 3 Encounters:   03/18/20 136/82   03/11/19 118/80   02/07/19 110/67      Wt Readings from Last 3 Encounters:   03/18/20 84.4 kg " "(186 lb)   03/11/19 85.3 kg (188 lb)   02/07/19 83.7 kg (184 lb 9.6 oz)        BMI: Estimated body mass index is 36.33 kg/m² as calculated from the following:    Height as of this encounter: 152.4 cm (60\").    Weight as of this encounter: 84.4 kg (186 lb).       General: alert, appears stated age and cooperative   Heart: regular rate and rhythm, S1, S2 normal, no murmur, click, rub or gallop   Lungs: clear to auscultation bilaterally   Abdomen: soft, non-tender, without masses, no organomegaly   Breast: inspection negative, no nipple discharge or bleeding, no masses or nodularity palpable   Vulva: normal and bartholin's, Urethra, [place]'s normal   Vagina: normal mucosa, normal discharge, atrophic appearance    Cervix: no lesions   Uterus: normal size   Adnexa: exam limited by habitus     As part of wellness and prevention, the following topics were discussed with the patient:     []  Nutrition  []  Physical activity/regular exercise   []  Healthy weight  []  Injury prevention  []  Smoking cessation  []  Substance misuse/abuse  []  Sexual behavior  []  STD prevention  []  Contaception  []  Dental health  []  Mental health  []  Immunization  [x]  Encouraged SBE        Assessment:    Claudia was seen today for gynecologic exam.    Diagnoses and all orders for this visit:    Well female exam with routine gynecological exam  -     POC Urinalysis Dipstick  -     IgP, Aptima HPV    Pap smear for cervical cancer screening  -     IgP, Aptima HPV    Screening for blood or protein in urine  -     POC Urinalysis Dipstick    Obesity (BMI 35.0-39.9 without comorbidity)    Family history of ovarian cancer    Abnormality of left breast on screening mammogram          Plan:  Return in about 1 year (around 3/18/2021) for Annual GYN exam.      Usman Mendosa MD  3/18/2020 10:25  "

## 2020-03-20 LAB
CYTOLOGIST CVX/VAG CYTO: NORMAL
CYTOLOGY CVX/VAG DOC CYTO: NORMAL
CYTOLOGY CVX/VAG DOC THIN PREP: NORMAL
DX ICD CODE: NORMAL
HIV 1 & 2 AB SER-IMP: NORMAL
HPV I/H RISK 4 DNA CVX QL PROBE+SIG AMP: NEGATIVE
OTHER STN SPEC: NORMAL
STAT OF ADQ CVX/VAG CYTO-IMP: NORMAL

## 2020-03-23 ENCOUNTER — TELEPHONE (OUTPATIENT)
Dept: OBSTETRICS AND GYNECOLOGY | Age: 55
End: 2020-03-23

## 2020-03-23 NOTE — TELEPHONE ENCOUNTER
----- Message from Usman Mendosa MD sent at 3/20/2020  3:45 PM EDT -----  Call pt:  PAP is negative.

## 2020-05-20 RX ORDER — IBUPROFEN 800 MG/1
TABLET ORAL
Qty: 40 TABLET | Refills: 0 | OUTPATIENT
Start: 2020-05-20

## 2020-05-21 NOTE — TELEPHONE ENCOUNTER
Review patients current med list and make correction if needed.     She cannot take mobic with motrin

## 2020-05-22 NOTE — TELEPHONE ENCOUNTER
Pts medication reviewed. Pt is currently taking Mobic. Explained to pt your unable to refill Motrin .She cannot take both.Pt verbalizes understanding.dn

## 2021-03-24 ENCOUNTER — OFFICE VISIT (OUTPATIENT)
Dept: OBSTETRICS AND GYNECOLOGY | Age: 56
End: 2021-03-24

## 2021-03-24 VITALS
DIASTOLIC BLOOD PRESSURE: 82 MMHG | HEIGHT: 60 IN | SYSTOLIC BLOOD PRESSURE: 128 MMHG | WEIGHT: 186 LBS | BODY MASS INDEX: 36.52 KG/M2

## 2021-03-24 DIAGNOSIS — Z12.31 SCREENING MAMMOGRAM, ENCOUNTER FOR: ICD-10-CM

## 2021-03-24 DIAGNOSIS — Z01.419 WELL FEMALE EXAM WITH ROUTINE GYNECOLOGICAL EXAM: Primary | ICD-10-CM

## 2021-03-24 DIAGNOSIS — E66.9 OBESITY (BMI 35.0-39.9 WITHOUT COMORBIDITY): ICD-10-CM

## 2021-03-24 DIAGNOSIS — Z13.89 SCREENING FOR BLOOD OR PROTEIN IN URINE: ICD-10-CM

## 2021-03-24 DIAGNOSIS — Z12.31 SCREENING MAMMOGRAM FOR HIGH-RISK PATIENT: ICD-10-CM

## 2021-03-24 DIAGNOSIS — Z12.4 SCREENING FOR MALIGNANT NEOPLASM OF CERVIX: ICD-10-CM

## 2021-03-24 DIAGNOSIS — Z11.51 SCREENING FOR HUMAN PAPILLOMAVIRUS (HPV): ICD-10-CM

## 2021-03-24 LAB
BILIRUB BLD-MCNC: NEGATIVE MG/DL
CLARITY, POC: CLEAR
COLOR UR: YELLOW
GLUCOSE UR STRIP-MCNC: NEGATIVE MG/DL
KETONES UR QL: NEGATIVE
LEUKOCYTE EST, POC: NEGATIVE
NITRITE UR-MCNC: NEGATIVE MG/ML
PH UR: 5.5 [PH] (ref 5–8)
PROT UR STRIP-MCNC: NEGATIVE MG/DL
RBC # UR STRIP: NEGATIVE /UL
SP GR UR: 1.03 (ref 1–1.03)
UROBILINOGEN UR QL: NORMAL

## 2021-03-24 PROCEDURE — 81002 URINALYSIS NONAUTO W/O SCOPE: CPT | Performed by: OBSTETRICS & GYNECOLOGY

## 2021-03-24 PROCEDURE — 99396 PREV VISIT EST AGE 40-64: CPT | Performed by: OBSTETRICS & GYNECOLOGY

## 2021-03-24 RX ORDER — CELECOXIB 100 MG/1
100-200 CAPSULE ORAL DAILY
COMMUNITY
Start: 2021-02-25

## 2021-03-24 NOTE — PROGRESS NOTES
Routine Annual Visit    3/24/2021    Patient: Claudia Rahman          MR#:[id number]    History of Present Illness    Chief Complaint   Patient presents with   • Gynecologic Exam     last pap 3/2020 neg, last mg 2020       55 y.o. female  who presents for annual exam.    Presents for routine annual exam feeling well without complaints.  She had Covid last November and is wondering if she should receive the vaccine.    Studies reviewed:    No LMP recorded (lmp unknown). Patient is postmenopausal.  Obstetric History:  OB History        2    Para   2    Term   2            AB        Living   2       SAB        TAB        Ectopic        Molar        Multiple        Live Births   2               Menstrual History:     No LMP recorded (lmp unknown). Patient is postmenopausal.       Sexual History:       ________________________________________  Patient Active Problem List   Diagnosis   • Well female exam with routine gynecological exam   • Family history of ovarian cancer   • Obesity (BMI 35.0-39.9 without comorbidity)     Past Medical History:   Diagnosis Date   • COVID-19 2020   • Diarrhea    • Helicobacter pylori (H. pylori) infection    • History of EKG 2016   • PVC (premature ventricular contraction)      Past Surgical History:   Procedure Laterality Date   • APPENDECTOMY     • BREAST BIOPSY     • BREAST LUMPECTOMY     • CHOLECYSTECTOMY     • COLONOSCOPY N/A 2019    Procedure: COLONOSCOPY;  Surgeon: Marlena Urban MD;  Location: [facility];  Service: Gastroenterology   • TUBAL ABDOMINAL LIGATION       Social History     Tobacco Use   Smoking Status Never Smoker   Smokeless Tobacco Never Used     Family History   Problem Relation Age of Onset   • Osteoporosis Mother    • Hypertension Mother    • Heart disease Mother    • Diabetes Mother    • Ovarian cancer Mother 50   • Lung cancer Sister 59   • Heart disease Sister    • Breast cancer Neg Hx    • Uterine  "cancer Neg Hx    • Colon cancer Neg Hx    • Melanoma Neg Hx    • Prostate cancer Neg Hx      Prior to Admission medications    Medication Sig Start Date End Date Taking? Authorizing Provider   celecoxib (CeleBREX) 100 MG capsule Take 100-200 mg by mouth Daily. 2/25/21  Yes Michell Milton MD   clobetasol (TEMOVATE) 0.05 % cream apply sparingly to affected area twice a day 7/26/17  Yes Michell Milton MD   ibuprofen (ADVIL,MOTRIN) 800 MG tablet TAKE 1 TABLET BY MOUTH THREE TIMES DAILY  Patient taking differently: Take 800 mg by mouth Every 6 (Six) Hours As Needed. 1/3/20  Yes Charito Tran APRN RA LORATA-D  MG per 24 hr tablet Take 1 tablet by mouth Daily As Needed. 4/21/16  Yes Michell Milton MD   calcium citrate-vitamin d (CITRACAL) 200-250 MG-UNIT tablet tablet Take  by mouth Daily.  3/24/21  Michell Milton MD   meloxicam (MOBIC) 15 MG tablet Take 15 mg by mouth Daily As Needed.  3/24/21  Michell Milton MD   saccharomyces boulardii (FLORASTOR) 250 MG capsule Take 250 mg by mouth 2 (Two) Times a Day As Needed.  3/24/21  Michell Milton MD     ________________________________________    Current contraception: post menopausal status  History of abnormal Pap smear: no  Family history of uterine or ovarian cancer: no  Family History of colon cancer/colon polyps: yes - Screen to date  History of abnormal mammogram: no  History of abnormal lipids: no    The following portions of the patient's history were reviewed and updated as appropriate: allergies, current medications, past family history, past medical history, past social history, past surgical history and problem list.    Review of Systems    Pertinent items are noted in HPI.       Objective   Physical Exam    /82   Ht 152.4 cm (60\")   Wt 84.4 kg (186 lb)   LMP  (LMP Unknown)   Breastfeeding No   BMI 36.33 kg/m²    BP Readings from Last 3 Encounters:   03/24/21 128/82   03/18/20 136/82   03/11/19 " "118/80      Wt Readings from Last 3 Encounters:   03/24/21 84.4 kg (186 lb)   03/18/20 84.4 kg (186 lb)   03/11/19 85.3 kg (188 lb)        BMI: Estimated body mass index is 36.33 kg/m² as calculated from the following:    Height as of this encounter: 152.4 cm (60\").    Weight as of this encounter: 84.4 kg (186 lb).       General: alert, appears stated age and cooperative   Heart: regular rate and rhythm, S1, S2 normal, no murmur, click, rub or gallop   Lungs: clear to auscultation bilaterally   Abdomen: soft, non-tender, without masses, no organomegaly   Breast: inspection negative, no nipple discharge or bleeding, no masses or nodularity palpable   External genitalia/Vulva: External genitalia including bartholin's glands, Urethra, [place]'s gland and urethra meatus are normal, Perineum, rectum and anus appear normal  and Bladder appears normal without significant prolapse    Vagina: normal mucosa, normal discharge   Cervix: no lesions   Uterus: normal size, mobile, non-tender or exam is limited habitus    Adnexa: exam limited by habitus     As part of wellness and prevention, the following topics were discussed with the patient:  Encouraged self breast exam  Physical activity and regular exercised encouraged.       Assessment:    Diagnoses and all orders for this visit:    1. Well female exam with routine gynecological exam (Primary)  -     IgP, Aptima HPV    2. Screening for blood or protein in urine  -     POC Urinalysis Dipstick    3. Obesity (BMI 35.0-39.9 without comorbidity)    4. Screening for malignant neoplasm of cervix  -     IgP, Aptima HPV    5. Screening for human papillomavirus (HPV)  -     IgP, Aptima HPV    6. Screening mammogram for high-risk patient    7. Screening mammogram, encounter for  -     Mammo Screening Digital Tomosynthesis Bilateral With CAD; Future        Plan:  Return in about 1 year (around 3/24/2022) for Annual GYN exam.      Usman Mendosa MD  3/24/2021 10:16 EDT  "

## 2021-03-30 ENCOUNTER — BULK ORDERING (OUTPATIENT)
Dept: CASE MANAGEMENT | Facility: OTHER | Age: 56
End: 2021-03-30

## 2021-03-30 DIAGNOSIS — Z23 IMMUNIZATION DUE: ICD-10-CM

## 2022-03-30 ENCOUNTER — OFFICE VISIT (OUTPATIENT)
Dept: OBSTETRICS AND GYNECOLOGY | Age: 57
End: 2022-03-30

## 2022-03-30 VITALS
SYSTOLIC BLOOD PRESSURE: 124 MMHG | BODY MASS INDEX: 34.95 KG/M2 | DIASTOLIC BLOOD PRESSURE: 76 MMHG | HEIGHT: 60 IN | WEIGHT: 178 LBS

## 2022-03-30 DIAGNOSIS — N89.8 VAGINAL DRYNESS: ICD-10-CM

## 2022-03-30 DIAGNOSIS — Z13.89 SCREENING FOR BLOOD OR PROTEIN IN URINE: ICD-10-CM

## 2022-03-30 DIAGNOSIS — Z12.31 SCREENING MAMMOGRAM, ENCOUNTER FOR: ICD-10-CM

## 2022-03-30 DIAGNOSIS — Z12.4 SCREENING FOR MALIGNANT NEOPLASM OF THE CERVIX: ICD-10-CM

## 2022-03-30 DIAGNOSIS — Z01.419 WELL WOMAN EXAM WITH ROUTINE GYNECOLOGICAL EXAM: Primary | ICD-10-CM

## 2022-03-30 DIAGNOSIS — E66.9 OBESITY (BMI 35.0-39.9 WITHOUT COMORBIDITY): ICD-10-CM

## 2022-03-30 DIAGNOSIS — Z11.51 SPECIAL SCREENING EXAMINATION FOR HUMAN PAPILLOMAVIRUS (HPV): ICD-10-CM

## 2022-03-30 LAB
BILIRUB BLD-MCNC: NEGATIVE MG/DL
GLUCOSE UR STRIP-MCNC: ABNORMAL MG/DL
KETONES UR QL: NEGATIVE
LEUKOCYTE EST, POC: NEGATIVE
NITRITE UR-MCNC: NEGATIVE MG/ML
PH UR: 5.5 [PH] (ref 5–8)
PROT UR STRIP-MCNC: NEGATIVE MG/DL
RBC # UR STRIP: ABNORMAL /UL
SP GR UR: 1.03 (ref 1–1.03)
UROBILINOGEN UR QL: NORMAL

## 2022-03-30 PROCEDURE — 81002 URINALYSIS NONAUTO W/O SCOPE: CPT | Performed by: OBSTETRICS & GYNECOLOGY

## 2022-03-30 PROCEDURE — 99396 PREV VISIT EST AGE 40-64: CPT | Performed by: OBSTETRICS & GYNECOLOGY

## 2022-03-30 RX ORDER — OXYMETAZOLINE HYDROCHLORIDE 0.05 G/100ML
2 SPRAY NASAL EVERY 12 HOURS
COMMUNITY

## 2022-03-30 RX ORDER — CONJUGATED ESTROGENS 0.62 MG/G
CREAM VAGINAL DAILY
Qty: 30 G | Refills: 6 | Status: SHIPPED | OUTPATIENT
Start: 2022-03-30 | End: 2022-04-27

## 2022-03-30 NOTE — PROGRESS NOTES
[facility]   Obstetrics and Gynecology       3/30/2022    Patient: Claudia Rahman          MR#:[id number]    History of Present Illness    Chief Complaint   Patient presents with   • Gynecologic Exam     last pap 3/2021 neg, last mg 2020   • Follow-up     Pt c/o vaginal dryness during intercourse        56 y.o. female  who presents for annual exam.    Presents for routine exam reporting vaginal dryness with intercourse.  The problem seems acutely exacerbated by taking sinus medication.    We discussed more copious use of Joselo glide that she is already using and considering vaginal estrogen if the problem persists  Studies reviewed:    No LMP recorded (lmp unknown). Patient is postmenopausal.  Obstetric History:  OB History        2    Para   2    Term   2            AB        Living   2       SAB        IAB        Ectopic        Molar        Multiple        Live Births   2               Menstrual History:     No LMP recorded (lmp unknown). Patient is postmenopausal.       Sexual History:       ________________________________________  Patient Active Problem List   Diagnosis   • Family history of ovarian cancer   • Obesity (BMI 35.0-39.9 without comorbidity)     Past Medical History:   Diagnosis Date   • COVID-19 2020   • Diarrhea    • Helicobacter pylori (H. pylori) infection    • History of EKG 2016   • PVC (premature ventricular contraction)      Past Surgical History:   Procedure Laterality Date   • APPENDECTOMY     • BREAST BIOPSY     • BREAST LUMPECTOMY     • CHOLECYSTECTOMY     • COLONOSCOPY N/A 2019    Procedure: COLONOSCOPY;  Surgeon: Marlena Urban MD;  Location: [facility];  Service: Gastroenterology   • TUBAL ABDOMINAL LIGATION       Social History     Tobacco Use   Smoking Status Never Smoker   Smokeless Tobacco Never Used     Family History   Problem Relation Age of Onset   • Osteoporosis Mother    • Hypertension Mother    • Heart  "disease Mother    • Diabetes Mother    • Ovarian cancer Mother 50   • Lung cancer Sister 59   • Heart disease Sister    • Breast cancer Neg Hx    • Uterine cancer Neg Hx    • Colon cancer Neg Hx    • Melanoma Neg Hx    • Prostate cancer Neg Hx      Prior to Admission medications    Medication Sig Start Date End Date Taking? Authorizing Provider   celecoxib (CeleBREX) 100 MG capsule Take 100-200 mg by mouth Daily. 2/25/21  Yes Michell Milton MD   clobetasol (TEMOVATE) 0.05 % cream apply sparingly to affected area twice a day 7/26/17  Yes Michell Milton MD   ibuprofen (ADVIL,MOTRIN) 800 MG tablet TAKE 1 TABLET BY MOUTH THREE TIMES DAILY  Patient taking differently: Take 800 mg by mouth Every 6 (Six) Hours As Needed. 1/3/20  Yes Charito Tran APRN   Oxymetazoline HCl (Nasal Spray) 0.05 % solution 2 sprays into the nostril(s) as directed by provider Every 12 (Twelve) Hours.   Yes Michell Milton MD   RA LORATA-D  MG per 24 hr tablet Take 1 tablet by mouth Daily As Needed. 4/21/16  Yes Michell Milton MD     ________________________________________    Current contraception: post menopausal status  History of abnormal Pap smear: no  Family history of uterine or ovarian cancer: no  Family History of colon cancer/colon polyps: no  History of abnormal mammogram: no  History of abnormal lipids: no    The following portions of the patient's history were reviewed and updated as appropriate: allergies, current medications, past family history, past medical history, past social history, past surgical history and problem list.    Review of Systems    Pertinent items are noted in HPI.       Objective   Physical Exam    /76   Ht 152.4 cm (60\")   Wt 80.7 kg (178 lb)   LMP  (LMP Unknown)   Breastfeeding No   BMI 34.76 kg/m²    BP Readings from Last 3 Encounters:   03/30/22 124/76   03/24/21 128/82   03/18/20 136/82      Wt Readings from Last 3 Encounters:   03/30/22 80.7 kg (178 lb) " "  03/24/21 84.4 kg (186 lb)   03/18/20 84.4 kg (186 lb)        BMI: Estimated body mass index is 34.76 kg/m² as calculated from the following:    Height as of this encounter: 152.4 cm (60\").    Weight as of this encounter: 80.7 kg (178 lb).       General: alert, appears stated age and cooperative   Heart: regular rate and rhythm, S1, S2 normal, no murmur, click, rub or gallop   Lungs: clear to auscultation bilaterally   Abdomen: soft, non-tender, without masses, no organomegaly   Breast: inspection negative, no nipple discharge or bleeding, no masses or nodularity palpable   External genitalia/Vulva: External genitalia including bartholin's glands, Urethra, [place]'s gland and urethra meatus are normal, Perineum, rectum and anus appear normal  and Bladder appears normal without significant prolapse    Vagina: normal mucosa, normal discharge, atrophic appearance    Cervix: no lesions   Uterus: normal size, mobile, non-tender and exam is limited habitus    Adnexa: exam limited by habitus     As part of wellness and prevention, the following topics were discussed with the patient:  Encouraged self breast exam  Physical activity and regular exercised encouraged.       Assessment:  Diagnoses and all orders for this visit:    1. Well woman exam with routine gynecological exam (Primary)  -     IgP, Aptima HPV    2. Screening for malignant neoplasm of the cervix  -     IgP, Aptima HPV    3. Special screening examination for human papillomavirus (HPV)  -     IgP, Aptima HPV    4. Screening for blood or protein in urine  -     POC Urinalysis Dipstick    5. Obesity (BMI 35.0-39.9 without comorbidity)    6. Screening mammogram, encounter for  -     Mammo Screening Digital Tomosynthesis Bilateral With CAD; Future    7. Vaginal dryness  -     conjugated estrogens (Premarin) 0.625 MG/GM vaginal cream; Insert  into the vagina Daily for 28 days. Apply 1/2 to 1 gram at bedtime as instructed  Dispense: 30 g; Refill: 6        Plan:  No " follow-ups on file.    Usman Mendosa MD  3/30/2022 10:59 EDT

## 2023-10-30 ENCOUNTER — OFFICE VISIT (OUTPATIENT)
Dept: OBSTETRICS AND GYNECOLOGY | Age: 58
End: 2023-10-30
Payer: COMMERCIAL

## 2023-10-30 VITALS
BODY MASS INDEX: 37.5 KG/M2 | WEIGHT: 191 LBS | HEIGHT: 60 IN | SYSTOLIC BLOOD PRESSURE: 120 MMHG | DIASTOLIC BLOOD PRESSURE: 66 MMHG

## 2023-10-30 DIAGNOSIS — Z12.31 SCREENING MAMMOGRAM FOR BREAST CANCER: ICD-10-CM

## 2023-10-30 DIAGNOSIS — Z12.4 SCREENING FOR MALIGNANT NEOPLASM OF CERVIX: ICD-10-CM

## 2023-10-30 DIAGNOSIS — Z11.51 SCREENING FOR HUMAN PAPILLOMAVIRUS (HPV): ICD-10-CM

## 2023-10-30 DIAGNOSIS — Z13.89 SCREENING FOR BLOOD OR PROTEIN IN URINE: ICD-10-CM

## 2023-10-30 DIAGNOSIS — Z01.419 WELL FEMALE EXAM WITH ROUTINE GYNECOLOGICAL EXAM: Primary | ICD-10-CM

## 2023-10-30 DIAGNOSIS — N89.8 VAGINAL DRYNESS: ICD-10-CM

## 2023-10-30 LAB
BILIRUB BLD-MCNC: NEGATIVE MG/DL
CLARITY, POC: CLEAR
COLOR UR: YELLOW
GLUCOSE UR STRIP-MCNC: NEGATIVE MG/DL
KETONES UR QL: NEGATIVE
LEUKOCYTE EST, POC: NEGATIVE
NITRITE UR-MCNC: NEGATIVE MG/ML
PH UR: 5.5 [PH] (ref 5–8)
PROT UR STRIP-MCNC: NEGATIVE MG/DL
RBC # UR STRIP: NEGATIVE /UL
SP GR UR: 1.01 (ref 1–1.03)
UROBILINOGEN UR QL: NORMAL

## 2023-10-30 RX ORDER — ESTRADIOL 4 UG/1
4 INSERT VAGINAL 3 TIMES WEEKLY
Qty: 12 EACH | Refills: 0 | COMMUNITY
Start: 2023-10-30

## 2023-10-30 NOTE — PROGRESS NOTES
[facility]   Obstetrics and Gynecology     10/30/2023    Patient: Claudia Rahman          MR#:[id number]    History of Present Illness    Chief Complaint   Patient presents with    Gynecologic Exam     CC: Annual. last pap 3/30/22 neg, last mammo 3/30/22 neg       58 y.o. female  who presents for annual exam.    Presents for regular annual exam complaining of vaginal dryness and rubbing.  The patient was prescribed Premarin cream previously but felt like she had side effects from it including dizziness and just generally feeling off.  We discussed alternative therapy and the patient's wants to try Imvexxy.  Samples and instructions were provided.        Relevant data reviewed:    No LMP recorded (lmp unknown). Patient is postmenopausal.  Obstetric History:  OB History          2    Para   2    Term   2            AB        Living   2         SAB        IAB        Ectopic        Molar        Multiple        Live Births   2               Menstrual History:     No LMP recorded (lmp unknown). Patient is postmenopausal.       Social History     Substance and Sexual Activity   Sexual Activity Yes    Partners: Male    Birth control/protection: Post-menopausal     ______________________________________  Patient Active Problem List   Diagnosis    Family history of ovarian cancer    Obesity (BMI 35.0-39.9 without comorbidity)     Past Medical History:   Diagnosis Date    COVID-19 2020    Diarrhea     Helicobacter pylori (H. pylori) infection     History of EKG 2016    PVC (premature ventricular contraction)      Past Surgical History:   Procedure Laterality Date    APPENDECTOMY      BREAST BIOPSY      BREAST LUMPECTOMY      CHOLECYSTECTOMY      COLONOSCOPY N/A 2019    Procedure: COLONOSCOPY;  Surgeon: Marlena Urban MD;  Location: [facility];  Service: Gastroenterology    TUBAL ABDOMINAL LIGATION       Social History     Tobacco Use   Smoking Status Never  "   Passive exposure: Never   Smokeless Tobacco Never     Family History   Problem Relation Age of Onset    Osteoporosis Mother     Hypertension Mother     Heart disease Mother     Diabetes Mother     Ovarian cancer Mother 50    Lung cancer Sister 59    Heart disease Sister     Breast cancer Neg Hx     Uterine cancer Neg Hx     Colon cancer Neg Hx     Melanoma Neg Hx     Prostate cancer Neg Hx      Prior to Admission medications    Medication Sig Start Date End Date Taking? Authorizing Provider   celecoxib (CeleBREX) 100 MG capsule Take 1-2 capsules by mouth Daily. 2/25/21  Yes Michell Milton MD   clobetasol (TEMOVATE) 0.05 % cream apply sparingly to affected area twice a day 7/26/17  Yes Michell Milton MD   ibuprofen (ADVIL,MOTRIN) 800 MG tablet TAKE 1 TABLET BY MOUTH THREE TIMES DAILY  Patient taking differently: Take 1 tablet by mouth Every 6 (Six) Hours As Needed. 1/3/20  Yes Charito Tran APRN   Oxymetazoline HCl (Nasal Spray) 0.05 % solution 2 sprays into the nostril(s) as directed by provider Every 12 (Twelve) Hours.   Yes Michell Milton MD   RA LORATA-D  MG per 24 hr tablet Take 1 tablet by mouth Daily As Needed. 4/21/16  Yes Michell Milton MD     _______________________________________    Current contraception: post menopausal status  History of abnormal Pap smear: no  Family history of uterine or ovarian cancer: no  Family History of colon cancer/colon polyps: no  History of abnormal mammogram: no  History of abnormal lipids: no    The following portions of the patient's history were reviewed and updated as appropriate: allergies, current medications, past family history, past medical history, past social history, past surgical history, and problem list.    Review of Systems    Pertinent items are noted in HPI.       Objective   Physical Exam    /66   Ht 152.4 cm (60\")   Wt 86.6 kg (191 lb)   LMP  (LMP Unknown)   BMI 37.30 kg/m²    BP Readings from Last 3 " "Encounters:   10/30/23 120/66   22 124/76   21 128/82      Wt Readings from Last 3 Encounters:   10/30/23 86.6 kg (191 lb)   22 80.7 kg (178 lb)   21 84.4 kg (186 lb)        BMI: Estimated body mass index is 37.3 kg/m² as calculated from the following:    Height as of this encounter: 152.4 cm (60\").    Weight as of this encounter: 86.6 kg (191 lb).       General: alert, appears stated age, and cooperative   Heart: regular rate and rhythm, S1, S2 normal, no murmur, click, rub or gallop   Lungs: clear to auscultation bilaterally   Abdomen: soft, non-tender, without masses, no organomegaly   Breast: inspection negative, no nipple discharge or bleeding, no masses or nodularity palpable   External genitalia/Vulva: moderate atrophy, External genitalia including bartholin's glands, Urethra, [place]'s gland and urethra meatus are normal, Perineum, rectum and anus appear normal , and Bladder appears normal without significant prolapse    Vagina: normal mucosa, normal discharge   Cervix: no lesions   Uterus: normal size and non-tender   Adnexa: normal adnexa     As part of wellness and prevention, the following topics were discussed with the patient:  Encouraged self breast exam  Physical activity and regular exercised encouraged.         Problem List   Meds  History  Prep for Surg   Imagin}    Assessment:  Diagnoses and all orders for this visit:    1. Well female exam with routine gynecological exam (Primary)  -     IGP, Apt HPV,rfx 16 / 18,45    2. Screening for human papillomavirus (HPV)  -     IGP, Apt HPV,rfx 16 / 18,45    3. Screening for malignant neoplasm of cervix  -     IGP, Apt HPV,rfx 16 / 18,45    4. Screening mammogram for breast cancer  -     Mammo Screening Digital Tomosynthesis Bilateral With CAD; Future    5. Screening for blood or protein in urine  -     POC Urinalysis Dipstick      Plan:  Return in 1 year (on 10/30/2024) for Annual exam.    Usman Mendosa MD  10/30/2023 " 11:43 EDT

## 2023-12-07 ENCOUNTER — TELEPHONE (OUTPATIENT)
Dept: OBSTETRICS AND GYNECOLOGY | Age: 58
End: 2023-12-07

## 2023-12-07 NOTE — TELEPHONE ENCOUNTER
Caller: KINGA ISAÍAS    Relationship:SELF    Callback number: [phone]    Is it ok to leave a message: [x] Yes [] No    Requested medication for samples: Estradiol (Imvexxy Maintenance Pack) 4 MCG insert     How much medication does the patient currently have left: NONE    Who will be picking up the samples: PATIENT    Do you need information about patient financial assistance for this medication: [] Yes [x] No    Additional details provided: IF POSSIBLE PATIENT WOULD LIKE TO  MORE SAMPLES TODAY.  IF THERE ARE NO SAMPLES PLEASE SEND A PRESCRIPTION TO [facility]

## 2024-02-09 ENCOUNTER — TELEPHONE (OUTPATIENT)
Dept: OBSTETRICS AND GYNECOLOGY | Age: 59
End: 2024-02-09
Payer: COMMERCIAL

## 2024-02-09 DIAGNOSIS — N95.2 ATROPHIC VULVOVAGINITIS: Primary | ICD-10-CM

## 2024-02-09 RX ORDER — ESTRADIOL 10 UG/1
10 INSERT VAGINAL 2 TIMES WEEKLY
Qty: 8 EACH | Refills: 6 | Status: SHIPPED | OUTPATIENT
Start: 2024-02-12

## 2024-02-09 NOTE — TELEPHONE ENCOUNTER
Caller: KINGA METZ     Relationship:SELF     Callback number: [phone]   Is it ok to leave a message: [x] Yes [] No    Requested medication for samples: Estradiol (Imvexxy Maintenance Pack) 4 MCG insert     How much medication does the patient currently have left: 2 DOSES     Who will be picking up the samples: SELF    Do you need information about patient financial assistance for this medication: [] Yes [] No    Additional details provided: IF POSSIBLE PATIENT WOULD LIKE TO  MORE SAMPLES TODAY, PLEASE ADVISE AND CONTACT PT BACK.

## 2024-11-13 ENCOUNTER — OFFICE VISIT (OUTPATIENT)
Dept: OBSTETRICS AND GYNECOLOGY | Age: 59
End: 2024-11-13
Payer: COMMERCIAL

## 2024-11-13 VITALS
DIASTOLIC BLOOD PRESSURE: 72 MMHG | BODY MASS INDEX: 34.75 KG/M2 | WEIGHT: 177 LBS | SYSTOLIC BLOOD PRESSURE: 130 MMHG | HEIGHT: 60 IN

## 2024-11-13 DIAGNOSIS — Z01.419 WELL FEMALE EXAM WITH ROUTINE GYNECOLOGICAL EXAM: Primary | ICD-10-CM

## 2024-11-13 DIAGNOSIS — Z13.89 SCREENING FOR BLOOD OR PROTEIN IN URINE: ICD-10-CM

## 2024-11-13 DIAGNOSIS — Z12.31 SCREENING MAMMOGRAM FOR BREAST CANCER: ICD-10-CM

## 2024-11-13 DIAGNOSIS — Z12.4 SCREENING FOR MALIGNANT NEOPLASM OF CERVIX: ICD-10-CM

## 2024-11-13 DIAGNOSIS — Z11.51 SCREENING FOR HUMAN PAPILLOMAVIRUS (HPV): ICD-10-CM

## 2024-11-13 PROBLEM — E66.9 OBESITY (BMI 35.0-39.9 WITHOUT COMORBIDITY): Status: RESOLVED | Noted: 2017-10-24 | Resolved: 2024-11-13

## 2024-11-13 LAB
BILIRUB BLD-MCNC: NEGATIVE MG/DL
CLARITY, POC: CLEAR
COLOR UR: YELLOW
GLUCOSE UR STRIP-MCNC: NEGATIVE MG/DL
KETONES UR QL: NEGATIVE
LEUKOCYTE EST, POC: NEGATIVE
NITRITE UR-MCNC: NEGATIVE MG/ML
PH UR: 7 [PH] (ref 5–8)
PROT UR STRIP-MCNC: NEGATIVE MG/DL
RBC # UR STRIP: NEGATIVE /UL
SP GR UR: 1.01 (ref 1–1.03)
UROBILINOGEN UR QL: NORMAL

## 2024-11-13 NOTE — PROGRESS NOTES
[facility]   Obstetrics and Gynecology     2024    Patient: Claudia Rahman          MR#:[id number]    History of Present Illness    Chief Complaint   Patient presents with    Gynecologic Exam     CC: Annual. last pap 10/30/23 neg, HPV neg, last mammo 24  neg       59 y.o. female  who presents for annual exam.    Patient presents for routine exam feeling well without complaints.    Relevant data reviewed:    No LMP recorded (lmp unknown). Patient is postmenopausal.  Obstetric History:  OB History          2    Para   2    Term   2            AB        Living   2         SAB        IAB        Ectopic        Molar        Multiple        Live Births   2               Menstrual History:     No LMP recorded (lmp unknown). Patient is postmenopausal.       Social History     Substance and Sexual Activity   Sexual Activity Yes    Partners: Male    Birth control/protection: Post-menopausal     ______________________________________  Patient Active Problem List   Diagnosis    Family history of ovarian cancer     Past Medical History:   Diagnosis Date    COVID-19 2020    Helicobacter pylori (H. pylori) infection     History of EKG 2016    PVC (premature ventricular contraction)      Past Surgical History:   Procedure Laterality Date    APPENDECTOMY      BREAST BIOPSY      BREAST LUMPECTOMY      CHOLECYSTECTOMY      COLONOSCOPY N/A 2019    Procedure: COLONOSCOPY;  Surgeon: Marlena Urban MD;  Location: [facility];  Service: Gastroenterology    TUBAL ABDOMINAL LIGATION       Social History     Tobacco Use   Smoking Status Never    Passive exposure: Never   Smokeless Tobacco Never     Family History   Problem Relation Age of Onset    Osteoporosis Mother     Hypertension Mother     Heart disease Mother     Diabetes Mother     Ovarian cancer Mother 50    Lung cancer Sister 59    Heart disease Sister     Breast cancer Neg Hx     Uterine cancer Neg Hx   "   Colon cancer Neg Hx     Melanoma Neg Hx     Prostate cancer Neg Hx      Prior to Admission medications    Medication Sig Start Date End Date Taking? Authorizing Provider   celecoxib (CeleBREX) 100 MG capsule Take 1-2 capsules by mouth Daily. 2/25/21  Yes Michell Milton MD   clobetasol (TEMOVATE) 0.05 % cream apply sparingly to affected area twice a day 7/26/17  Yes Michell Milton MD   Estradiol (Imvexxy Maintenance Pack) 10 MCG insert Insert 10 mcg into the vagina 2 (Two) Times a Week. 2/12/24  Yes Usman Mendosa MD   ibuprofen (ADVIL,MOTRIN) 800 MG tablet TAKE 1 TABLET BY MOUTH THREE TIMES DAILY  Patient taking differently: Take 1 tablet by mouth Every 6 (Six) Hours As Needed. 1/3/20  Yes Charito Tran APRN   Oxymetazoline HCl (Nasal Spray) 0.05 % solution 2 sprays into the nostril(s) as directed by provider Every 12 (Twelve) Hours.   Yes Michell Milton MD   Semaglutide-Weight Management 0.5 MG/0.5ML solution auto-injector Inject 0.5 mL under the skin into the appropriate area as directed. 7/3/24  Yes Michell Milton MD   RA LORATA-D  MG per 24 hr tablet Take 1 tablet by mouth Daily As Needed.  Patient not taking: Reported on 11/13/2024 4/21/16   Michell Milton MD     _______________________________________    Current contraception: post menopausal status  History of abnormal Pap smear: no  Family history of uterine or ovarian cancer: no  Family History of colon cancer/colon polyps: no  History of abnormal mammogram: no  History of abnormal lipids: no    The following portions of the patient's history were reviewed and updated as appropriate: allergies, current medications, past family history, past medical history, past social history, past surgical history, and problem list.    Review of Systems    Pertinent items are noted in HPI.       Objective   Physical Exam    /72   Ht 152.4 cm (60\")   Wt 80.3 kg (177 lb)   LMP  (LMP Unknown)   BMI 34.57 kg/m²  " "  BP Readings from Last 3 Encounters:   24 130/72   10/30/23 120/66   22 124/76      Wt Readings from Last 3 Encounters:   24 80.3 kg (177 lb)   10/30/23 86.6 kg (191 lb)   22 80.7 kg (178 lb)        BMI: Estimated body mass index is 34.57 kg/m² as calculated from the following:    Height as of this encounter: 152.4 cm (60\").    Weight as of this encounter: 80.3 kg (177 lb).       General: alert, appears stated age, and cooperative   Heart: regular rate and rhythm, S1, S2 normal, no murmur, click, rub or gallop   Lungs: clear to auscultation bilaterally   Abdomen: soft, non-tender, without masses, no organomegaly   Breast: inspection negative, no nipple discharge or bleeding, no masses or nodularity palpable   External genitalia/Vulva: External genitalia including bartholin's glands, Urethra, [place]'s gland and urethra meatus are normal, Perineum, rectum and anus appear normal , and Bladder appears normal without significant prolapse    Vagina: normal mucosa, normal discharge   Cervix: no lesions   Uterus: normal size and non-tender   Adnexa: exam limited by habitus     As part of wellness and prevention, the following topics were discussed with the patient:  Encouraged self breast exam  Physical activity and regular exercised encouraged.         Problem List   Meds  History  Prep for Surg   Imagin}    Assessment:  Diagnoses and all orders for this visit:    1. Well female exam with routine gynecological exam (Primary)  -     IGP, Apt HPV,rfx 16 / 18,45    2. Screening for human papillomavirus (HPV)  -     IGP, Apt HPV,rfx 16 / 18,45    3. Screening for malignant neoplasm of cervix  -     IGP, Apt HPV,rfx 16 / 18,45    4. Screening mammogram for breast cancer  -     Mammo Screening Digital Tomosynthesis Bilateral With CAD; Future    5. Screening for blood or protein in urine  -     POC Urinalysis Dipstick      Plan:  Return in 1 year (on 2025) for Annual exam.    Usman Mendosa, " MD  11/13/2024 10:53 EST

## 2024-11-20 LAB
CYTOLOGIST CVX/VAG CYTO: NORMAL
CYTOLOGY CVX/VAG DOC CYTO: NORMAL
CYTOLOGY CVX/VAG DOC THIN PREP: NORMAL
DX ICD CODE: NORMAL
HPV I/H RISK 4 DNA CVX QL PROBE+SIG AMP: NEGATIVE
Lab: NORMAL
OTHER STN SPEC: NORMAL
STAT OF ADQ CVX/VAG CYTO-IMP: NORMAL

## 2024-12-23 DIAGNOSIS — N95.2 ATROPHIC VULVOVAGINITIS: ICD-10-CM

## 2024-12-23 RX ORDER — ESTRADIOL 10 UG/1
10 INSERT VAGINAL 2 TIMES WEEKLY
Qty: 8 EACH | Refills: 6 | Status: SHIPPED | OUTPATIENT
Start: 2024-12-23

## 2025-01-22 ENCOUNTER — TELEPHONE (OUTPATIENT)
Dept: OBSTETRICS AND GYNECOLOGY | Age: 60
End: 2025-01-22
Payer: COMMERCIAL

## 2025-01-22 NOTE — TELEPHONE ENCOUNTER
Caller: KINGA METZ    Relationship: SELF    Callback number: [phone]    Is it ok to leave a message: [x] Yes [] No    Requested medication for samples: IMVEXXY    How much medication does the patient currently have left: N/A    Who will be picking up the samples: PT    Do you need information about patient financial assistance for this medication: [] Yes [x] No    Additional details provided: PLEASE CALL THE PT TO LET HER KNOW IF THE [facility] HAS ANY OF THESE SAMPLES

## 2025-04-07 DIAGNOSIS — N95.2 ATROPHIC VULVOVAGINITIS: ICD-10-CM

## 2025-04-07 RX ORDER — ESTRADIOL 10 UG/1
10 INSERT VAGINAL 2 TIMES WEEKLY
Qty: 8 EACH | Refills: 12 | Status: SHIPPED | OUTPATIENT
Start: 2025-04-07

## 2025-04-07 NOTE — TELEPHONE ENCOUNTER
Patient requesting refill for Imvexxy 10 to be sent to [facility] . She will also check with local pharmacy if this is covered by her new insurance .

## (undated) DEVICE — HYBRID TUBING/CAP SET FOR OLYMPUS® SCOPES: Brand: ERBE

## (undated) DEVICE — BW-412T DISP COMBO CLEANING BRUSH: Brand: SINGLE USE COMBINATION CLEANING BRUSH

## (undated) DEVICE — SPNG GZ WOVN 4X4IN 12PLY 10/BX STRL

## (undated) DEVICE — MASK,FACE,FLUID RESIST,SHLD,EARLOOP: Brand: MEDLINE

## (undated) DEVICE — SUCTION CANISTER, 3000CC,SAFELINER: Brand: DEROYAL

## (undated) DEVICE — SYR LL 3CC

## (undated) DEVICE — ENDO. PORT CONNECTOR W/VALVE FOR OLYMPUS® SCOPES: Brand: ERBE

## (undated) DEVICE — Device: Brand: DEFENDO AIR/WATER/SUCTION AND BIOPSY VALVE

## (undated) DEVICE — Device

## (undated) DEVICE — GOWN ISOL W/THUMB UNIV BLU BX/15